# Patient Record
(demographics unavailable — no encounter records)

---

## 2018-01-01 NOTE — DIS-2
DISCHARGE SUMMARY

 

DELIVERY DATE:  2018

 

DATE OF DISCHARGE:  2018

 

ATTENDING PHYSICIAN:  Odilia Haskins DO

 

RESIDENT:  Rose Arevalo MD

 

DISCHARGE DIAGNOSES:

1.  Term appropriate for gestational age viable male.

2.  No significant positive family history.

3.  Maternal history is significant for preeclampsia with severe range blood pressures following deli
very.

 

PROCEDURES:  None.

 

HISTORY OF PRESENT ILLNESS:  Baby boy represented the 38.3-week product delivered of an 18-year-old G
1 now P1 with blood type O-positive, chlamydia negative, GBS negative, gonorrhea negative, hepatitis 
B surface antigen negative, HIV negative, RPR negative, rubella immune.  The family history is nonsig
nificant.  Pregnancy was relatively uncomplicated.

 

Normal spontaneous vaginal delivery was accomplished at 2300 hours on 2018 by Dr. Dalia Perez and
 Dr. Rose Singh with Dr. Odilia Haskins, attending.  No resuscitation needed.  Apgars were 9 and 9 at 1
 and 5 minutes respectively.

 

PHYSICAL EXAMINATION:  Weight was 2902 grams.  Length was 18.5 inches and head circumference was 33 c
m.  The physical exam was unremarkable.

 

HOSPITAL COURSE:  The infant experienced an unremarkable hospital course, established feedings well, 
voided and stooled normally.

 

DISPOSITION:

1.  Discharged to the postpartum mother's room on 2018 with a discharge weight of 2741 grams.

2.  Medications:  None.

3.  Diet:  Breast.

4.  Blood type O positive and Selam negative.

5.  Hearing screen passed on 2018.

6.  Hepatitis B vaccine given on 2018.

7.  Discharge bilirubin was 6.9 on 2018, placing the patient in low-risk category.

8.  Follow up with [facility] in 2 days.